# Patient Record
Sex: MALE | Race: WHITE | Employment: OTHER | URBAN - METROPOLITAN AREA
[De-identification: names, ages, dates, MRNs, and addresses within clinical notes are randomized per-mention and may not be internally consistent; named-entity substitution may affect disease eponyms.]

---

## 2017-03-24 DIAGNOSIS — C61 MALIGNANT NEOPLASM OF PROSTATE (H): ICD-10-CM

## 2017-03-24 PROCEDURE — 84153 ASSAY OF PSA TOTAL: CPT | Performed by: FAMILY MEDICINE

## 2017-03-24 PROCEDURE — 36415 COLL VENOUS BLD VENIPUNCTURE: CPT | Performed by: FAMILY MEDICINE

## 2017-03-25 LAB — PSA SERPL-MCNC: NORMAL UG/L (ref 0–4)

## 2017-04-04 ENCOUNTER — MYC MEDICAL ADVICE (OUTPATIENT)
Dept: UROLOGY | Facility: CLINIC | Age: 72
End: 2017-04-04

## 2017-04-05 ENCOUNTER — TELEPHONE (OUTPATIENT)
Dept: UROLOGY | Facility: CLINIC | Age: 72
End: 2017-04-05

## 2017-04-05 DIAGNOSIS — C61 PROSTATE CANCER (H): Primary | ICD-10-CM

## 2017-04-05 NOTE — TELEPHONE ENCOUNTER
Reason for Call:  Other     Detailed comments: Missed an appt last week - Does he need to be seen. The appt was just to get PSA results, and pt was able to see them on MyChart. - Please advise.     Phone Number Patient can be reached at: Home number on file [phone] (home)    Best Time: Any    Can we leave a detailed message on this number? YES    Call taken on 4/5/2017 at 10:40 AM by Denise Behrendt

## 2017-04-06 NOTE — TELEPHONE ENCOUNTER
Called and spoke to patient, informed that Dr Garza does not need to see him if he is doing OK. Patient states that he is doing fine, so he was instructed to return in 3 months for another PSA recheck. Informed that lab orders are in his chart. Patient voiced understanding and is in agreement with plan.     Deepali [place] MA

## 2017-08-04 DIAGNOSIS — C61 PROSTATE CANCER (H): ICD-10-CM

## 2017-08-04 LAB — PSA SERPL-MCNC: NORMAL UG/L (ref 0–4)

## 2017-08-04 PROCEDURE — 36415 COLL VENOUS BLD VENIPUNCTURE: CPT | Performed by: FAMILY MEDICINE

## 2017-08-04 PROCEDURE — 84153 ASSAY OF PSA TOTAL: CPT | Performed by: FAMILY MEDICINE

## 2017-08-09 ENCOUNTER — OFFICE VISIT (OUTPATIENT)
Dept: UROLOGY | Facility: CLINIC | Age: 72
End: 2017-08-09
Payer: COMMERCIAL

## 2017-08-09 VITALS — SYSTOLIC BLOOD PRESSURE: 127 MMHG | DIASTOLIC BLOOD PRESSURE: 80 MMHG | HEART RATE: 63 BPM | RESPIRATION RATE: 16 BRPM

## 2017-08-09 DIAGNOSIS — C61 MALIGNANT NEOPLASM OF PROSTATE (H): Primary | ICD-10-CM

## 2017-08-09 PROCEDURE — 99213 OFFICE O/P EST LOW 20 MIN: CPT | Performed by: UROLOGY

## 2017-08-09 NOTE — MR AVS SNAPSHOT
After Visit Summary   8/9/2017    Eduardo Cortes    MRN: [id number]           Patient Information     Date Of Birth          1945        Visit Information        Provider Department      8/9/2017 1:00 PM Waylon Garza MD [facility]        Today's Diagnoses     Malignant neoplasm of prostate (H)    -  1       Follow-ups after your visit        Future tests that were ordered for you today     Open Future Orders        Priority Expected Expires Ordered    PSA tumor marker Routine 11/9/2017 8/10/2018 8/9/2017            Who to contact     If you have questions or need follow up information about today's clinic visit or your schedule please contact [facility] directly at [phone].  Normal or non-critical lab and imaging results will be communicated to you by Sentropihart, letter or phone within 4 business days after the clinic has received the results. If you do not hear from us within 7 days, please contact the clinic through Sentropihart or phone. If you have a critical or abnormal lab result, we will notify you by phone as soon as possible.  Submit refill requests through [email or web address] or call your pharmacy and they will forward the refill request to us. Please allow 3 business days for your refill to be completed.          Additional Information About Your Visit        MyChart Information     [email or web address] gives you secure access to your electronic health record. If you see a primary care provider, you can also send messages to your care team and make appointments. If you have questions, please call your primary care clinic.  If you do not have a primary care provider, please call [phone] and they will assist you.        Care EveryWhere ID     This is your Care EveryWhere ID. This could be used by other organizations to access your [place] medical records  [id number]        Your Vitals Were     Pulse Respirations                63 16           Blood Pressure from  Last 3 Encounters:   08/09/17 127/80   12/14/16 145/90   08/31/16 (!) 155/92    Weight from Last 3 Encounters:   06/08/16 86.2 kg (190 lb)   05/27/16 91.2 kg (201 lb 1 oz)   05/23/16 93.7 kg (206 lb 9.6 oz)               Primary Care Provider Office Phone # Fax #    Soy Ramirez [phone] [address] [place] [id number]        Equal Access to Services     [facility]: Hadii aad ku hadasho Soomaali, waaxda luqadaha, qaybta kaalmada adeegyada, contreras ann . So [facility] [phone].    ATENCIÓN: Si habla español, tiene a maxwell disposición servicios gratuitos de asistencia lingüística. LlThe [facility] [phone].    We comply with applicable federal civil rights laws and [place] laws. We do not discriminate on the basis of race, color, national origin, age, disability sex, sexual orientation or gender identity.            Thank you!     Thank you for choosing [facility]  for your care. Our goal is always to provide you with excellent care. Hearing back from our patients is one way we can continue to improve our services. Please take a few minutes to complete the written survey that you may receive in the mail after your visit with us. Thank you!             Your Updated Medication List - Protect others around you: Learn how to safely use, store and throw away your medicines at www.disposemymeds.org.          This list is accurate as of: 8/9/17  6:26 PM.  Always use your most recent med list.                   Brand Name Dispense Instructions for use Diagnosis    CALCIUM CARBONATE TBCR 1000 MG OR      1 daily        FISH OIL PO      1 daily        GLUCOSAMINE CHONDRO COMPLEX OR      1 daily        ketoconazole 2 % cream    NIZORAL    30 g    Apply topically 2 times daily For rash on forehead    Seborrheic dermatitis       MULTIPLE VITAMIN PO      on daily        neomycin-bacitracin-polymyxin 5-400-5000 ointment     14.2 g    Apply topically 2 times  daily To urethra where catheter goes in    Malignant neoplasm of prostate (H)       omeprazole 20 MG CR capsule    priLOSEC    90 capsule    Take 1 capsule (20 mg) by mouth daily As needed.    Gastroesophageal reflux disease without esophagitis       tolterodine 2 MG tablet    DETROL    60 tablet    Take 1 tablet (2 mg) by mouth 2 times daily as needed (bladder spasms)    Malignant neoplasm of prostate (H)       vitamin C 500 MG Chew      1 TABLET DAILY

## 2017-08-09 NOTE — NURSING NOTE
"Chief Complaint   Patient presents with     Prostate Cancer     psa recheck       Initial /80 (BP Location: Right arm, Patient Position: Chair, Cuff Size: Adult Regular)  Pulse 63  Resp 16 Estimated body mass index is 27.26 kg/(m^2) as calculated from the following:    Height as of 6/8/16: 1.778 m (5' 10\").    Weight as of 6/8/16: 86.2 kg (190 lb).  Medication Reconciliation: complete.  adonis mitchell LPN      "

## 2017-08-10 NOTE — PROGRESS NOTES
REASON FOR VISIT TODAY:  Prostate cancer.      HISTORY OF PRESENT ILLNESS:  Mr. Henri Ivan is a 72-year-old gentleman followed in our clinic for history of prostate cancer.  The patient underwent a robotically-assisted laparoscopic radical prostatectomy on 2016.  Final pathology demonstrated [place] 3+4 equals 7 disease, stage pT3a, with a clinically insignificant margin under the cauterized artifact.  His PSAs have been undetectable to date.  The patient is continent, wears no pads, and has erections adequate for penetration without sildenafil.      OBJECTIVE:  On exam, his blood pressure is 127/80, pulse 63.  He is in no acute distress.      DATA:  The patient has a PSA from 2017 which is undetectable      ASSESSMENT AND PLAN:  Over half of today's 15-minute visit was spent counseling the patient regarding his prostate cancer.  I suggested to Mr. Ivan that we are pleased to see his PSA remains undetectable at this time.  We will plan on rechecking a PSA in 3-4 months, at which point we will schedule the patient for a phone appointment to go over the results.  This will be just before the patient leaves for the winter.  We will then shift to 6-month PSA checks in all likelihood if things remain undetectable.  We would then touch base with the patient in person after he comes back [place].  Mr. Ivan is in agreement with the plan, and we will chat with him on the phone in 3-4 months with his next PSA.         BELINDA RUANO MD             D: 2017 18:26   T: 08/10/2017 01:22   MT: IRINA#[id number]      Name:     HENRI IVAN   MRN:      [id number]        Account:      [id number]   :      1945           Visit Date:   2017      Document: [id number]       cc: Soy [place] MD

## 2017-10-31 ENCOUNTER — PRE VISIT (OUTPATIENT)
Dept: UROLOGY | Facility: CLINIC | Age: 72
End: 2017-10-31

## 2017-10-31 NOTE — TELEPHONE ENCOUNTER
Patient is coming in to see  for a PSA check, call patient and left a message to please get PSA done before appointment.

## 2017-11-15 DIAGNOSIS — C61 MALIGNANT NEOPLASM OF PROSTATE (H): ICD-10-CM

## 2017-11-15 LAB — PSA SERPL-MCNC: <0.01 UG/L (ref 0–4)

## 2017-11-15 PROCEDURE — 36415 COLL VENOUS BLD VENIPUNCTURE: CPT | Performed by: UROLOGY

## 2017-11-15 PROCEDURE — 84153 ASSAY OF PSA TOTAL: CPT | Performed by: UROLOGY

## 2017-12-13 DIAGNOSIS — C61 MALIGNANT NEOPLASM OF PROSTATE (H): Primary | ICD-10-CM

## 2018-05-25 DIAGNOSIS — C61 MALIGNANT NEOPLASM OF PROSTATE (H): ICD-10-CM

## 2018-05-25 LAB — PSA SERPL-MCNC: <0.01 UG/L (ref 0–4)

## 2018-05-25 PROCEDURE — 84153 ASSAY OF PSA TOTAL: CPT | Performed by: UROLOGY

## 2018-05-25 PROCEDURE — 36415 COLL VENOUS BLD VENIPUNCTURE: CPT | Performed by: UROLOGY

## 2018-05-31 ENCOUNTER — OFFICE VISIT (OUTPATIENT)
Dept: UROLOGY | Facility: CLINIC | Age: 73
End: 2018-05-31
Payer: COMMERCIAL

## 2018-05-31 VITALS — DIASTOLIC BLOOD PRESSURE: 86 MMHG | RESPIRATION RATE: 16 BRPM | SYSTOLIC BLOOD PRESSURE: 135 MMHG | HEART RATE: 96 BPM

## 2018-05-31 DIAGNOSIS — C61 PROSTATE CANCER (H): Primary | ICD-10-CM

## 2018-05-31 PROCEDURE — 99213 OFFICE O/P EST LOW 20 MIN: CPT | Performed by: UROLOGY

## 2018-05-31 NOTE — PROGRESS NOTES
Visit Date:   05/31/2018      DATE OF CLINIC NOTE: 05/31/2018.      REASON FOR VISIT TODAY:  Prostate cancer.      BRIEF COURSE:  Mr. Cortes is a 72-year-old gentleman followed in our clinic for history of prostate cancer.  The patient underwent a robotically-assisted laparoscopic radical prostatectomy on 05/27/2016.  Final pathology demonstrated [place] 3+4 equals 7, T3a NX MX disease with positive margins under the cauterized artifact.  The patient's PSAs have been undetectable.  The patient comes in today noting that he continues to do well.  He wears no pads and has erections sufficient for intercourse without the use of Viagra.  The patient does inform us that he will be moving shortly to [place], [place].      PHYSICAL EXAMINATION:   VITAL:  On exam his blood pressure is 135/86, pulse is 96.   GENERAL:  The patient is in no acute distress.      The patient's PSA from 5/25/2018, is undetectable      ASSESSMENT AND PLAN:  Over half of today's 15-minute visit was spent counseling the patient regarding his prostate cancer.  I suggested to Mr. Cortes that we are pleased to see his PSA remains undetectable.  We were now 2 years out from his surgery.  I suggested to the patient that we should continue to check PSAs every 6 months for the next 2 years and then at year four the patient could go to annual PSA checks, presuming his PSA remains undetectable.  The patient again will be moving to [place], [place] shortly and will establish care with a primary physician down there soon after moving.  The patient will then discuss with his primary doctor if they are comfortable checking his PSAs or whether he will also need to find a urologist in [place].  Certainly if the patient's PSA comes back at all, he should be referred back to Urology.  We will be happy to send any records the patient may need from our office upon his request after he moves.  We wish Mr. Cortes all the best.         BELINDA RUANO MD              D: 2018   T: 2018   MT: NTS      Name:     HENRI IVAN   MRN:      [id number]        Account:      [id number]   :      1945           Visit Date:   2018      Document: [id number]

## 2018-05-31 NOTE — NURSING NOTE
"Initial /86 (BP Location: Left arm, Patient Position: Chair, Cuff Size: Adult Regular)  Pulse 96  Resp 16 Estimated body mass index is 27.26 kg/(m^2) as calculated from the following:    Height as of 6/8/16: 1.778 m (5' 10\").    Weight as of 6/8/16: 86.2 kg (190 lb). .    .  adonis mitchell LPN      "

## 2018-05-31 NOTE — MR AVS SNAPSHOT
After Visit Summary   5/31/2018    Eduardo Cortes    MRN: [id number]           Patient Information     Date Of Birth          1945        Visit Information        Provider Department      5/31/2018 1:00 PM Waylon Garza MD [facility]        Today's Diagnoses     Prostate cancer (H)    -  1       Follow-ups after your visit        Who to contact     If you have questions or need follow up information about today's clinic visit or your schedule please contact [facility] directly at [phone].  Normal or non-critical lab and imaging results will be communicated to you by QMedichart, letter or phone within 4 business days after the clinic has received the results. If you do not hear from us within 7 days, please contact the clinic through Open Mobile Solutionst or phone. If you have a critical or abnormal lab result, we will notify you by phone as soon as possible.  Submit refill requests through Workstreamer or call your pharmacy and they will forward the refill request to us. Please allow 3 business days for your refill to be completed.          Additional Information About Your Visit        MyChart Information     Workstreamer gives you secure access to your electronic health record. If you see a primary care provider, you can also send messages to your care team and make appointments. If you have questions, please call your primary care clinic.  If you do not have a primary care provider, please call [phone] and they will assist you.        Care EveryWhere ID     This is your Care EveryWhere ID. This could be used by other organizations to access your [place] medical records  [id number]        Your Vitals Were     Pulse Respirations                96 16           Blood Pressure from Last 3 Encounters:   05/31/18 135/86   08/09/17 127/80   12/14/16 145/90    Weight from Last 3 Encounters:   06/08/16 86.2 kg (190 lb)   05/27/16 91.2 kg (201 lb 1 oz)   05/23/16 93.7 kg  (206 lb 9.6 oz)              Today, you had the following     No orders found for display       Primary Care Provider Office Phone # Fax #    Soy Ramirez [phone] [address] [place] [address]        Equal Access to Services     BAILEEISIDORO MIGUEL : Hadii aad ku hadangelicao Soomaali, waaxda luqadaha, qaybta kaalmada adeegyada, waxisiah sukumarin josen adejasmin hunt la'francheskamahin burnett. So [facility] [phone].    ATENCIÓN: Si habla español, tiene a maxwell disposición servicios gratuitos de asistencia lingüística. Llame al [phone].    We comply with applicable federal civil rights laws and [place] laws. We do not discriminate on the basis of race, color, national origin, age, disability, sex, sexual orientation, or gender identity.            Thank you!     Thank you for choosing [facility]  for your care. Our goal is always to provide you with excellent care. Hearing back from our patients is one way we can continue to improve our services. Please take a few minutes to complete the written survey that you may receive in the mail after your visit with us. Thank you!             Your Updated Medication List - Protect others around you: Learn how to safely use, store and throw away your medicines at www.disposemymeds.org.          This list is accurate as of 5/31/18  3:05 PM.  Always use your most recent med list.                   Brand Name Dispense Instructions for use Diagnosis    CALCIUM CARBONATE TBCR 1000 MG OR      1 daily        FISH OIL PO      1 daily        GLUCOSAMINE CHONDRO COMPLEX OR      1 daily        ketoconazole 2 % cream    NIZORAL    30 g    Apply topically 2 times daily For rash on forehead    Seborrheic dermatitis       MULTIPLE VITAMIN PO      on daily        neomycin-bacitracin-polymyxin 5-400-5000 ointment     14.2 g    Apply topically 2 times daily To urethra where catheter goes in    Malignant neoplasm of prostate (H)       omeprazole 20 MG CR capsule    priLOSEC     90 capsule    Take 1 capsule (20 mg) by mouth daily As needed.    Gastroesophageal reflux disease without esophagitis       tolterodine 2 MG tablet    DETROL    60 tablet    Take 1 tablet (2 mg) by mouth 2 times daily as needed (bladder spasms)    Malignant neoplasm of prostate (H)       vitamin C 500 MG Chew      1 TABLET DAILY

## 2019-11-02 ENCOUNTER — HEALTH MAINTENANCE LETTER (OUTPATIENT)
Age: 74
End: 2019-11-02

## 2020-02-10 ENCOUNTER — HEALTH MAINTENANCE LETTER (OUTPATIENT)
Age: 75
End: 2020-02-10

## 2020-11-16 ENCOUNTER — HEALTH MAINTENANCE LETTER (OUTPATIENT)
Age: 75
End: 2020-11-16

## 2021-04-03 ENCOUNTER — HEALTH MAINTENANCE LETTER (OUTPATIENT)
Age: 76
End: 2021-04-03

## 2021-09-12 ENCOUNTER — HEALTH MAINTENANCE LETTER (OUTPATIENT)
Age: 76
End: 2021-09-12

## 2022-04-24 ENCOUNTER — HEALTH MAINTENANCE LETTER (OUTPATIENT)
Age: 77
End: 2022-04-24

## 2022-11-19 ENCOUNTER — HEALTH MAINTENANCE LETTER (OUTPATIENT)
Age: 77
End: 2022-11-19

## 2023-06-01 ENCOUNTER — HEALTH MAINTENANCE LETTER (OUTPATIENT)
Age: 78
End: 2023-06-01